# Patient Record
Sex: FEMALE | Race: WHITE | NOT HISPANIC OR LATINO | Employment: FULL TIME | URBAN - METROPOLITAN AREA
[De-identification: names, ages, dates, MRNs, and addresses within clinical notes are randomized per-mention and may not be internally consistent; named-entity substitution may affect disease eponyms.]

---

## 2023-06-29 DIAGNOSIS — F41.1 GENERALIZED ANXIETY DISORDER: ICD-10-CM

## 2023-06-29 DIAGNOSIS — I10 ESSENTIAL (PRIMARY) HYPERTENSION: ICD-10-CM

## 2023-06-29 RX ORDER — LOSARTAN POTASSIUM AND HYDROCHLOROTHIAZIDE 12.5; 1 MG/1; MG/1
TABLET ORAL
Qty: 90 TABLET | Refills: 1 | OUTPATIENT
Start: 2023-06-29

## 2023-06-29 RX ORDER — ESCITALOPRAM OXALATE 10 MG/1
TABLET ORAL
Qty: 90 TABLET | Refills: 1 | OUTPATIENT
Start: 2023-06-29

## 2023-06-29 RX ORDER — METOPROLOL SUCCINATE 50 MG/1
TABLET, EXTENDED RELEASE ORAL
Qty: 90 TABLET | Refills: 1 | OUTPATIENT
Start: 2023-06-29

## 2023-06-29 NOTE — TELEPHONE ENCOUNTER
Patient was lvm on machine to call back and schedule. If patient calls back please schedule   Please advise

## 2023-07-06 DIAGNOSIS — I10 ESSENTIAL HYPERTENSION: ICD-10-CM

## 2023-07-06 DIAGNOSIS — F41.1 GENERALIZED ANXIETY DISORDER: ICD-10-CM

## 2023-07-06 PROBLEM — E87.1 HYPONATREMIA: Status: ACTIVE | Noted: 2023-07-06

## 2023-07-06 PROBLEM — D75.89 MACROCYTOSIS: Status: ACTIVE | Noted: 2023-07-06

## 2023-07-06 RX ORDER — ESCITALOPRAM OXALATE 10 MG/1
10 TABLET ORAL DAILY
COMMUNITY
End: 2023-07-06 | Stop reason: SDUPTHER

## 2023-07-06 RX ORDER — LOSARTAN POTASSIUM AND HYDROCHLOROTHIAZIDE 12.5; 1 MG/1; MG/1
1 TABLET ORAL DAILY
Qty: 30 TABLET | Refills: 1 | Status: SHIPPED | OUTPATIENT
Start: 2023-07-06 | End: 2023-08-10 | Stop reason: SDUPTHER

## 2023-07-06 RX ORDER — ESCITALOPRAM OXALATE 10 MG/1
10 TABLET ORAL DAILY
Qty: 30 TABLET | Refills: 1 | Status: SHIPPED | OUTPATIENT
Start: 2023-07-06 | End: 2023-08-10 | Stop reason: SDUPTHER

## 2023-07-06 RX ORDER — METOPROLOL SUCCINATE 50 MG/1
50 TABLET, EXTENDED RELEASE ORAL DAILY
Qty: 30 TABLET | Refills: 1 | Status: SHIPPED | OUTPATIENT
Start: 2023-07-06 | End: 2023-08-10 | Stop reason: SDUPTHER

## 2023-07-06 RX ORDER — LOSARTAN POTASSIUM AND HYDROCHLOROTHIAZIDE 12.5; 1 MG/1; MG/1
1 TABLET ORAL DAILY
COMMUNITY
End: 2023-07-06 | Stop reason: SDUPTHER

## 2023-07-06 RX ORDER — METOPROLOL SUCCINATE 50 MG/1
50 TABLET, EXTENDED RELEASE ORAL DAILY
COMMUNITY
End: 2023-07-06 | Stop reason: SDUPTHER

## 2023-07-06 NOTE — TELEPHONE ENCOUNTER
Rx Refill Request Telephone Encounter    Name:  Lexii More  :  [id number]  Medication Name:    METOPROLOL 50 MG   LOSARTAN POTASSIUM-hydrochlorothiazide 100-12.5 MG   ESCITALOPRAM 10 MG  ALL MEDICATIONS LAST FILLED ON   Specific Pharmacy location:  [facility]  Date of last appointment:    Date of next appointment:  08/10  Best number to reach patient:  [phone]    PLEASE ADVISE.

## 2023-08-08 PROBLEM — T88.7XXA MEDICATION SIDE EFFECT: Status: ACTIVE | Noted: 2023-08-08

## 2023-08-08 PROBLEM — R05.8 COUGH DUE TO ACE INHIBITOR: Status: ACTIVE | Noted: 2023-08-08

## 2023-08-08 PROBLEM — R03.0 ELEVATED BLOOD PRESSURE READING: Status: ACTIVE | Noted: 2023-08-08

## 2023-08-08 PROBLEM — K52.9 CHRONIC DIARRHEA: Status: ACTIVE | Noted: 2023-08-08

## 2023-08-08 PROBLEM — F41.0 PANIC DISORDER: Status: ACTIVE | Noted: 2023-08-08

## 2023-08-08 PROBLEM — T46.4X5A COUGH DUE TO ACE INHIBITOR: Status: ACTIVE | Noted: 2023-08-08

## 2023-08-10 ENCOUNTER — OFFICE VISIT (OUTPATIENT)
Dept: PRIMARY CARE | Facility: CLINIC | Age: 48
End: 2023-08-10

## 2023-08-10 VITALS
HEART RATE: 89 BPM | HEIGHT: 61 IN | BODY MASS INDEX: 19.07 KG/M2 | DIASTOLIC BLOOD PRESSURE: 88 MMHG | TEMPERATURE: 97.6 F | WEIGHT: 101 LBS | RESPIRATION RATE: 16 BRPM | SYSTOLIC BLOOD PRESSURE: 136 MMHG | OXYGEN SATURATION: 97 %

## 2023-08-10 DIAGNOSIS — I10 ESSENTIAL HYPERTENSION: Primary | ICD-10-CM

## 2023-08-10 DIAGNOSIS — F41.1 GENERALIZED ANXIETY DISORDER: ICD-10-CM

## 2023-08-10 PROCEDURE — 4004F PT TOBACCO SCREEN RCVD TLK: CPT | Performed by: FAMILY MEDICINE

## 2023-08-10 PROCEDURE — 3079F DIAST BP 80-89 MM HG: CPT | Performed by: FAMILY MEDICINE

## 2023-08-10 PROCEDURE — 99212 OFFICE O/P EST SF 10 MIN: CPT | Performed by: FAMILY MEDICINE

## 2023-08-10 PROCEDURE — 3075F SYST BP GE 130 - 139MM HG: CPT | Performed by: FAMILY MEDICINE

## 2023-08-10 RX ORDER — ESCITALOPRAM OXALATE 10 MG/1
10 TABLET ORAL DAILY
Qty: 90 TABLET | Refills: 1 | Status: SHIPPED | OUTPATIENT
Start: 2023-08-10 | End: 2023-12-23

## 2023-08-10 RX ORDER — METOPROLOL SUCCINATE 50 MG/1
50 TABLET, EXTENDED RELEASE ORAL DAILY
Qty: 90 TABLET | Refills: 1 | Status: SHIPPED | OUTPATIENT
Start: 2023-08-10 | End: 2024-03-02

## 2023-08-10 RX ORDER — LOSARTAN POTASSIUM AND HYDROCHLOROTHIAZIDE 12.5; 1 MG/1; MG/1
1 TABLET ORAL DAILY
Qty: 90 TABLET | Refills: 1 | Status: SHIPPED | OUTPATIENT
Start: 2023-08-10 | End: 2024-03-02

## 2023-08-10 ASSESSMENT — ANXIETY QUESTIONNAIRES
3. WORRYING TOO MUCH ABOUT DIFFERENT THINGS: NOT AT ALL
4. TROUBLE RELAXING: NOT AT ALL
6. BECOMING EASILY ANNOYED OR IRRITABLE: NOT AT ALL
5. BEING SO RESTLESS THAT IT IS HARD TO SIT STILL: NOT AT ALL
IF YOU CHECKED OFF ANY PROBLEMS ON THIS QUESTIONNAIRE, HOW DIFFICULT HAVE THESE PROBLEMS MADE IT FOR YOU TO DO YOUR WORK, TAKE CARE OF THINGS AT HOME, OR GET ALONG WITH OTHER PEOPLE: NOT DIFFICULT AT ALL
2. NOT BEING ABLE TO STOP OR CONTROL WORRYING: NOT AT ALL
1. FEELING NERVOUS, ANXIOUS, OR ON EDGE: NOT AT ALL
7. FEELING AFRAID AS IF SOMETHING AWFUL MIGHT HAPPEN: NOT AT ALL
GAD7 TOTAL SCORE: 0

## 2023-08-10 ASSESSMENT — PATIENT HEALTH QUESTIONNAIRE - PHQ9
SUM OF ALL RESPONSES TO PHQ9 QUESTIONS 1 AND 2: 0
2. FEELING DOWN, DEPRESSED OR HOPELESS: NOT AT ALL
1. LITTLE INTEREST OR PLEASURE IN DOING THINGS: NOT AT ALL

## 2023-08-11 ENCOUNTER — TELEPHONE (OUTPATIENT)
Dept: PRIMARY CARE | Facility: CLINIC | Age: 48
End: 2023-08-11

## 2023-09-11 ENCOUNTER — TELEPHONE (OUTPATIENT)
Dept: PRIMARY CARE | Facility: CLINIC | Age: 48
End: 2023-09-11

## 2023-10-11 ENCOUNTER — TELEPHONE (OUTPATIENT)
Dept: PRIMARY CARE | Facility: CLINIC | Age: 48
End: 2023-10-11

## 2023-12-23 DIAGNOSIS — F41.1 GENERALIZED ANXIETY DISORDER: ICD-10-CM

## 2023-12-23 RX ORDER — ESCITALOPRAM OXALATE 10 MG/1
10 TABLET ORAL DAILY
Qty: 90 TABLET | Refills: 0 | Status: SHIPPED | OUTPATIENT
Start: 2023-12-23 | End: 2024-03-15 | Stop reason: SDUPTHER

## 2024-02-13 ENCOUNTER — APPOINTMENT (OUTPATIENT)
Dept: PRIMARY CARE | Facility: CLINIC | Age: 49
End: 2024-02-13

## 2024-03-02 DIAGNOSIS — I10 ESSENTIAL HYPERTENSION: ICD-10-CM

## 2024-03-02 RX ORDER — LOSARTAN POTASSIUM AND HYDROCHLOROTHIAZIDE 12.5; 1 MG/1; MG/1
1 TABLET ORAL DAILY
Qty: 30 TABLET | Refills: 0 | Status: SHIPPED | OUTPATIENT
Start: 2024-03-02 | End: 2024-03-15 | Stop reason: SDUPTHER

## 2024-03-02 RX ORDER — METOPROLOL SUCCINATE 50 MG/1
50 TABLET, EXTENDED RELEASE ORAL DAILY
Qty: 30 TABLET | Refills: 0 | Status: SHIPPED | OUTPATIENT
Start: 2024-03-02 | End: 2024-03-15 | Stop reason: SDUPTHER

## 2024-03-14 LAB
ALANINE AMINOTRANSFERASE (SGPT) (U/L) IN SER/PLAS: 17 U/L
ALKALINE PHOSPHATASE (U/L) IN SER/PLAS: 59 U/L
ASPARTATE AMINOTRANSFERASE (SGOT) (U/L) IN SER/PLAS: 29 U/L
BILIRUBIN, TOTAL  (MG/DL) IN SER/PLAS: 1.2 MG/DL (ref 0–1.2)
CHOLESTEROL (MG/DL) IN SER/PLAS: 188 MG/DL
CREATININE (MG/DL) IN SER/PLAS: 0.62 MG/DL
GLUCOSE: 83 MG/DL
HDL-CHOLESTEROL (MG/DL) IN SER/PLAS: 82 MG/DL
LDL CHOLESTEROL: 92 MG/DL
POTASSIUM (MMOL/L) IN SER/PLAS: 4.3 MMOL/L
SODIUM (MMOL/L) IN SER/PLAS: 125 MMOL/L
TRIGLYCERIDES  (MG/DL) IN SER/PLAS: 75 MG/DL
UREA NITROGEN (MG/DL) IN SER/PLAS: 6 MG/DL

## 2024-03-15 ENCOUNTER — OFFICE VISIT (OUTPATIENT)
Dept: PRIMARY CARE | Facility: CLINIC | Age: 49
End: 2024-03-15

## 2024-03-15 VITALS
DIASTOLIC BLOOD PRESSURE: 84 MMHG | SYSTOLIC BLOOD PRESSURE: 138 MMHG | TEMPERATURE: 97.4 F | HEIGHT: 61 IN | BODY MASS INDEX: 19.52 KG/M2 | HEART RATE: 76 BPM | RESPIRATION RATE: 16 BRPM | WEIGHT: 103.4 LBS | OXYGEN SATURATION: 99 %

## 2024-03-15 DIAGNOSIS — Z00.00 HEALTHCARE MAINTENANCE: ICD-10-CM

## 2024-03-15 DIAGNOSIS — F41.1 GENERALIZED ANXIETY DISORDER: ICD-10-CM

## 2024-03-15 DIAGNOSIS — I10 ESSENTIAL HYPERTENSION: Primary | ICD-10-CM

## 2024-03-15 PROCEDURE — 4004F PT TOBACCO SCREEN RCVD TLK: CPT | Performed by: FAMILY MEDICINE

## 2024-03-15 PROCEDURE — 3079F DIAST BP 80-89 MM HG: CPT | Performed by: FAMILY MEDICINE

## 2024-03-15 PROCEDURE — 99212 OFFICE O/P EST SF 10 MIN: CPT | Performed by: FAMILY MEDICINE

## 2024-03-15 PROCEDURE — 3075F SYST BP GE 130 - 139MM HG: CPT | Performed by: FAMILY MEDICINE

## 2024-03-15 RX ORDER — ESCITALOPRAM OXALATE 10 MG/1
10 TABLET ORAL DAILY
Qty: 90 TABLET | Refills: 0 | Status: SHIPPED | OUTPATIENT
Start: 2024-03-15

## 2024-03-15 RX ORDER — LOSARTAN POTASSIUM AND HYDROCHLOROTHIAZIDE 12.5; 1 MG/1; MG/1
1 TABLET ORAL DAILY
Qty: 90 TABLET | Refills: 1 | Status: SHIPPED | OUTPATIENT
Start: 2024-03-15

## 2024-03-15 RX ORDER — METOPROLOL SUCCINATE 50 MG/1
50 TABLET, EXTENDED RELEASE ORAL DAILY
Qty: 90 TABLET | Refills: 1 | Status: SHIPPED | OUTPATIENT
Start: 2024-03-15

## 2024-03-15 ASSESSMENT — ENCOUNTER SYMPTOMS
DEPRESSED MOOD: 0
NERVOUS/ANXIOUS: 1
IRRITABILITY: 1
DECREASED CONCENTRATION: 0
SHORTNESS OF BREATH: 0

## 2024-03-15 ASSESSMENT — ANXIETY QUESTIONNAIRES
1. FEELING NERVOUS, ANXIOUS, OR ON EDGE: NOT AT ALL
7. FEELING AFRAID AS IF SOMETHING AWFUL MIGHT HAPPEN: NOT AT ALL
2. NOT BEING ABLE TO STOP OR CONTROL WORRYING: NOT AT ALL
4. TROUBLE RELAXING: NOT AT ALL
IF YOU CHECKED OFF ANY PROBLEMS ON THIS QUESTIONNAIRE, HOW DIFFICULT HAVE THESE PROBLEMS MADE IT FOR YOU TO DO YOUR WORK, TAKE CARE OF THINGS AT HOME, OR GET ALONG WITH OTHER PEOPLE: NOT DIFFICULT AT ALL
3. WORRYING TOO MUCH ABOUT DIFFERENT THINGS: NOT AT ALL
5. BEING SO RESTLESS THAT IT IS HARD TO SIT STILL: NOT AT ALL
GAD7 TOTAL SCORE: 0
6. BECOMING EASILY ANNOYED OR IRRITABLE: NOT AT ALL

## 2024-03-15 NOTE — PROGRESS NOTES
"Subjective   Patient ID: Lexii More is a 48 y.o. female who presents for Follow-up (Hypertension, anxiety) and Annual Exam.    Patient is here for follow-up on hypertension. She is exercising and is adherent to a low-salt diet. Patient denies chest pain, dyspnea, exertional chest pressure/discomfort, near-syncope, orthopnea, palpitations, paroxysmal nocturnal dyspnea, and syncope. Taking his medication regularly with no side effects.    Anxiety  Presents for follow-up visit. Symptoms include irritability and nervous/anxious behavior. Patient reports no chest pain, decreased concentration, depressed mood, dizziness, excessive worry, palpitations, shortness of breath or suicidal ideas. Symptoms occur occasionally. The severity of symptoms is mild. The patient sleeps 8 hours per night. The quality of sleep is good. Nighttime awakenings: occasional.     Compliance with medications is %.      Review of Systems   Constitutional:  Positive for irritability. Negative for chills and fever.   HENT:  Negative for congestion, ear pain, nosebleeds, rhinorrhea and sore throat.    Respiratory:  Negative for cough, shortness of breath and wheezing.    Cardiovascular:  Negative for chest pain, palpitations and leg swelling.   Gastrointestinal:  Negative for abdominal pain, constipation, diarrhea and vomiting.   Genitourinary:  Negative for dysuria, frequency and hematuria.   Neurological:  Negative for dizziness, tremors, numbness and headaches.   Psychiatric/Behavioral:  Negative for decreased concentration and suicidal ideas. The patient is nervous/anxious.      Objective   /84 (BP Location: Left arm, Patient Position: Sitting)   Pulse 76   Temp 36.3 °C (97.4 °F)   Resp 16   Ht 1.549 m (5' 1\")   Wt 46.9 kg (103 lb 6.4 oz)   SpO2 99%   BMI 19.54 kg/m²     Physical Exam  Constitutional:       General: She is not in acute distress.     Appearance: Normal appearance.   HENT:      Head: Normocephalic and " atraumatic.      Mouth/Throat:      Mouth: Mucous membranes are moist.      Pharynx: Oropharynx is clear. No oropharyngeal exudate or posterior oropharyngeal erythema.   Eyes:      General: No scleral icterus.     Extraocular Movements: Extraocular movements intact.      Pupils: Pupils are equal, round, and reactive to light.   Cardiovascular:      Rate and Rhythm: Normal rate and regular rhythm.      Pulses: Normal pulses.      Heart sounds: No murmur heard.     No friction rub. No gallop.   Pulmonary:      Effort: Pulmonary effort is normal.      Breath sounds: No wheezing, rhonchi or rales.   Skin:     General: Skin is warm.      Coloration: Skin is not jaundiced or pale.      Findings: No erythema or rash.   Neurological:      General: No focal deficit present.      Mental Status: She is alert and oriented to person, place, and time.      Cranial Nerves: No cranial nerve deficit.      Sensory: No sensory deficit.      Coordination: Coordination normal.      Gait: Gait normal.         Assessment/Plan   Problem List Items Addressed This Visit       Generalized anxiety disorder     The risks and benefits of my recommendations, as well as other treatment options were discussed with the patient today.    The side effects of the medications were discussed.  Advised not to take the medication with alcohol .  Exercise regularly and this can give you a sense of well being and help decrease feelings of anxiety.;  Get plenty of sleep. Sleep rests your brain as well as your body, and can improve your general sense of wellbeing as well as your mood.;  Avoid alcohol and drug abuse. It may seem that alcohol or drugs relax you. But in the long run they make anxiety worse and cause more problems.;  Avoid caffeine. Caffeine is found in coffee, tea, soft drinks and chocolate. Caffeine may increase your sense of anxiety because it stimulates your nervous system. Also avoid over-the-counter diet pills, and cough and cold medicines  "that contain a decongestant.;  Confront the things that have made you anxious in the past. Begin by just picturing yourself confronting these things. By doing this, you can get used to the idea of confronting the things that make you anxious before you actually do it. After you feel more comfortable picturing yourself confronting these things, you can begin to actually face them.;  If you feel yourself getting anxious, practice a relaxation technique or focus on a simple task, such as counting backward from 100 to 0.;  Although feelings of anxiety are scary, they won't hurt you. Label the level of your fear from 0 to 10 and keep track as it goes up and down. Notice that it doesn't stay at a very high level for more than a few seconds. When the fear comes, accept it. Wait and give it time to pass without running away from it.;  Take medications as prescribed.           Relevant Medications    escitalopram (Lexapro) 10 mg tablet    Other Relevant Orders    Follow Up In [facility] - PCP - Established    Essential hypertension - Primary     Dietary sodium restriction.  Regular aerobic exercise program is recommended to help achieve and maintain normal body weight, fitness and improve lipid balance. .  Dietary changes: Increase soluble fiber  Plant sterols 2grams per day (e.g. Benecol)  Reduce saturated fat, \"trans\" monounsaturated fatty acids, and cholesterol           Relevant Medications    losartan-hydrochlorothiazide (Hyzaar) 100-12.5 mg tablet    metoprolol succinate XL (Toprol-XL) 50 mg 24 hr tablet    Other Relevant Orders    Follow Up In [facility] - PCP - Established    Healthcare maintenance     Discussed the need for mammogram and colonoscopy.  Patient declined.  The importance of healthcare maintenance and prevention of treatment was emphasized.  She understands that declined.    Recommend low-cholesterol diet, low-fat diet and low-salt diet.  The need for lifelong dietary compliance in " order to reduce cardiac risk is recommended.  We will also recommend regular exercise program to improve lipid balance and overall health.  Recommend decreasing fat and cholesterol in diet, increasing aerobic exercise with a goal of 4 or more days per week            Scribe Attestation  By signing my name below, Elise SLAUGHTER Scribe   attest that this documentation has been prepared under the direction and in the presence of Good Franco MD.

## 2024-03-15 NOTE — ASSESSMENT & PLAN NOTE
Discussed the need for mammogram and colonoscopy.  Patient declined.  The importance of healthcare maintenance and prevention of treatment was emphasized.  She understands that declined.    Recommend low-cholesterol diet, low-fat diet and low-salt diet.  The need for lifelong dietary compliance in order to reduce cardiac risk is recommended.  We will also recommend regular exercise program to improve lipid balance and overall health.  Recommend decreasing fat and cholesterol in diet, increasing aerobic exercise with a goal of 4 or more days per week

## 2024-03-16 ASSESSMENT — ENCOUNTER SYMPTOMS
TREMORS: 0
DIARRHEA: 0
PALPITATIONS: 0
NUMBNESS: 0
DYSURIA: 0
HEMATURIA: 0
CONSTIPATION: 0
DIZZINESS: 0
ABDOMINAL PAIN: 0
HEADACHES: 0
SORE THROAT: 0
CHILLS: 0
COUGH: 0
VOMITING: 0
WHEEZING: 0
RHINORRHEA: 0
FREQUENCY: 0
FEVER: 0

## 2024-03-18 ENCOUNTER — TELEPHONE (OUTPATIENT)
Dept: PRIMARY CARE | Facility: CLINIC | Age: 49
End: 2024-03-18

## 2024-03-18 DIAGNOSIS — D75.89 MACROCYTOSIS: Primary | ICD-10-CM

## 2024-03-18 DIAGNOSIS — E87.1 HYPONATREMIA: ICD-10-CM

## 2024-03-18 NOTE — TELEPHONE ENCOUNTER
----- Message from Good Franco MD sent at 3/16/2024  4:30 PM EDT -----  Please call Lexii More that her Lab results revealed: Sodium level, normal kidney and liver functions, normal blood glucose.  Normal blood count but significantly decreased from previous result and increased size of red blood cells.  Normal cholesterol including good cholesterol/HDL and bad cholesterol/LDL.  Schedule BMP for hyponatremia, CBC with differentials and vitamin B12 and folate for macrocytosis STAT.

## 2024-06-28 DIAGNOSIS — F41.1 GENERALIZED ANXIETY DISORDER: ICD-10-CM

## 2024-06-28 RX ORDER — ESCITALOPRAM OXALATE 10 MG/1
10 TABLET ORAL DAILY
Qty: 90 TABLET | Refills: 0 | Status: SHIPPED | OUTPATIENT
Start: 2024-06-28

## 2024-06-28 NOTE — TELEPHONE ENCOUNTER
Rx Refill Request Telephone Encounter    Name:  Lexii More  :  [id number]  Medication Name:  lexapro 10mg   Specific Pharmacy location:  [facility]   Date of last appointment:  3/15/24  Date of next appointment:  N/A

## 2024-09-29 ENCOUNTER — TELEPHONE (OUTPATIENT)
Dept: PRIMARY CARE | Facility: CLINIC | Age: 49
End: 2024-09-29

## 2024-09-29 DIAGNOSIS — I10 ESSENTIAL HYPERTENSION: ICD-10-CM

## 2024-09-29 RX ORDER — METOPROLOL SUCCINATE 50 MG/1
50 TABLET, EXTENDED RELEASE ORAL DAILY
Qty: 90 TABLET | Refills: 1 | OUTPATIENT
Start: 2024-09-29

## 2024-09-29 RX ORDER — LOSARTAN POTASSIUM AND HYDROCHLOROTHIAZIDE 12.5; 1 MG/1; MG/1
1 TABLET ORAL DAILY
Qty: 90 TABLET | Refills: 1 | OUTPATIENT
Start: 2024-09-29

## 2024-09-30 ENCOUNTER — TELEPHONE (OUTPATIENT)
Dept: PRIMARY CARE | Facility: CLINIC | Age: 49
End: 2024-09-30

## 2024-09-30 DIAGNOSIS — F41.1 GENERALIZED ANXIETY DISORDER: Primary | ICD-10-CM

## 2024-09-30 RX ORDER — ESCITALOPRAM OXALATE 10 MG/1
10 TABLET ORAL DAILY
Qty: 90 TABLET | Refills: 0 | OUTPATIENT
Start: 2024-09-30

## 2024-10-17 LAB
CREATININE (MG/DL) IN SER/PLAS: 0.58 MG/DL
GLUCOSE: 93 MG/DL
POTASSIUM (MMOL/L) IN SER/PLAS: 4 MMOL/L
SODIUM (MMOL/L) IN SER/PLAS: 127 MMOL/L
UREA NITROGEN (MG/DL) IN SER/PLAS: 5 MG/DL

## 2024-10-21 ENCOUNTER — APPOINTMENT (OUTPATIENT)
Dept: PRIMARY CARE | Facility: CLINIC | Age: 49
End: 2024-10-21

## 2024-10-21 VITALS
TEMPERATURE: 97.9 F | WEIGHT: 101 LBS | RESPIRATION RATE: 14 BRPM | HEART RATE: 110 BPM | BODY MASS INDEX: 19.07 KG/M2 | SYSTOLIC BLOOD PRESSURE: 138 MMHG | HEIGHT: 61 IN | OXYGEN SATURATION: 99 % | DIASTOLIC BLOOD PRESSURE: 84 MMHG

## 2024-10-21 DIAGNOSIS — D75.89 MACROCYTOSIS: ICD-10-CM

## 2024-10-21 DIAGNOSIS — F41.1 GENERALIZED ANXIETY DISORDER: ICD-10-CM

## 2024-10-21 DIAGNOSIS — E87.1 HYPONATREMIA: ICD-10-CM

## 2024-10-21 DIAGNOSIS — I10 ESSENTIAL HYPERTENSION: Primary | ICD-10-CM

## 2024-10-21 PROCEDURE — 4004F PT TOBACCO SCREEN RCVD TLK: CPT | Performed by: FAMILY MEDICINE

## 2024-10-21 PROCEDURE — 3075F SYST BP GE 130 - 139MM HG: CPT | Performed by: FAMILY MEDICINE

## 2024-10-21 PROCEDURE — 3008F BODY MASS INDEX DOCD: CPT | Performed by: FAMILY MEDICINE

## 2024-10-21 PROCEDURE — 99212 OFFICE O/P EST SF 10 MIN: CPT | Performed by: FAMILY MEDICINE

## 2024-10-21 PROCEDURE — 3079F DIAST BP 80-89 MM HG: CPT | Performed by: FAMILY MEDICINE

## 2024-10-21 RX ORDER — LOSARTAN POTASSIUM 100 MG/1
100 TABLET ORAL DAILY
Qty: 90 TABLET | Refills: 0 | Status: SHIPPED | OUTPATIENT
Start: 2024-10-21

## 2024-10-21 RX ORDER — ESCITALOPRAM OXALATE 10 MG/1
10 TABLET ORAL DAILY
Qty: 90 TABLET | Refills: 0 | Status: SHIPPED | OUTPATIENT
Start: 2024-10-21

## 2024-10-21 RX ORDER — METOPROLOL SUCCINATE 50 MG/1
50 TABLET, EXTENDED RELEASE ORAL DAILY
Qty: 90 TABLET | Refills: 0 | Status: SHIPPED | OUTPATIENT
Start: 2024-10-21

## 2024-10-21 ASSESSMENT — ENCOUNTER SYMPTOMS
TREMORS: 0
NUMBNESS: 0
HEMATURIA: 0
COUGH: 0
VOMITING: 0
DEPRESSED MOOD: 0
HEADACHES: 0
PANIC: 0
DIARRHEA: 0
DYSURIA: 0
MUSCLE TENSION: 0
INSOMNIA: 0
FEVER: 0
SHORTNESS OF BREATH: 0
RESTLESSNESS: 0
WHEEZING: 0
DECREASED CONCENTRATION: 0
PALPITATIONS: 0
NAUSEA: 0
IRRITABILITY: 0
FREQUENCY: 0
FEELING OF CHOKING: 0
CONFUSION: 0
COMPULSIONS: 0
HYPERVENTILATION: 0
NERVOUS/ANXIOUS: 0
CHILLS: 0
DIZZINESS: 0
RHINORRHEA: 0
MALAISE: 0
CONSTIPATION: 0
ABDOMINAL PAIN: 0
THOUGHT CONTENT - OBSESSIONS: 0
SORE THROAT: 0

## 2024-10-21 ASSESSMENT — ANXIETY QUESTIONNAIRES
GAD7 TOTAL SCORE: 0
1. FEELING NERVOUS, ANXIOUS, OR ON EDGE: NOT AT ALL
7. FEELING AFRAID AS IF SOMETHING AWFUL MIGHT HAPPEN: NOT AT ALL
6. BECOMING EASILY ANNOYED OR IRRITABLE: NOT AT ALL
4. TROUBLE RELAXING: NOT AT ALL
IF YOU CHECKED OFF ANY PROBLEMS ON THIS QUESTIONNAIRE, HOW DIFFICULT HAVE THESE PROBLEMS MADE IT FOR YOU TO DO YOUR WORK, TAKE CARE OF THINGS AT HOME, OR GET ALONG WITH OTHER PEOPLE: NOT DIFFICULT AT ALL
2. NOT BEING ABLE TO STOP OR CONTROL WORRYING: NOT AT ALL
3. WORRYING TOO MUCH ABOUT DIFFERENT THINGS: NOT AT ALL
5. BEING SO RESTLESS THAT IT IS HARD TO SIT STILL: NOT AT ALL

## 2024-10-21 NOTE — ASSESSMENT & PLAN NOTE
She admits to excessive alcohol consumption which may be because of her macrocytosis and hyponatremia.  She was advised of the importance of reducing alcohol consumption.  We will change her Hyzaar to Cozaar 100 mg daily.  The SSRI may also contribute to the hyponatremia.  We will reevaluate at the next appointment.  Consider further evaluation if residual is still low at that time.  Repeat BMP prior to appointment in 2 months.

## 2024-10-21 NOTE — ASSESSMENT & PLAN NOTE
"We will discontinue the Losartan hydrochlorothiazide and start her on Losartan 100 mg daily. Dietary sodium restriction.  Regular aerobic exercise program is recommended to help achieve and maintain normal body weight, fitness and improve lipid balance. .  Dietary changes: Increase soluble fiber  Plant sterols 2grams per day (e.g. Benecol)  Reduce saturated fat, \"trans\" monounsaturated fatty acids, and cholesterol.   "

## 2024-10-21 NOTE — PROGRESS NOTES
"Subjective   Patient ID: Lexii More is a 49 y.o. female who presents for Follow-up (Hypertension, Anxiety and labs).    Patient is here for follow-up on hypertension. Cardiac symptoms: none. She has no chest pain, dyspnea, exertional chest pressure/discomfort, near-syncope, orthopnea, palpitations, paroxysmal nocturnal dyspnea, and syncope. Use of agents associated with hypertension: none. History of target organ damage: none.    Anxiety  Presents for follow-up visit. Patient reports no chest pain, compulsions, confusion, decreased concentration, depressed mood, dizziness, dry mouth, excessive worry, feeling of choking, hyperventilation, impotence, insomnia, irritability, malaise, muscle tension, nausea, nervous/anxious behavior, obsessions, palpitations, panic, restlessness, shortness of breath or suicidal ideas. The patient sleeps 7 hours per night. The quality of sleep is good. Nighttime awakenings: occasional.     Compliance with medications is %.        Review of Systems   Constitutional:  Negative for chills, fever and irritability.   HENT:  Negative for congestion, ear pain, nosebleeds, rhinorrhea and sore throat.    Respiratory:  Negative for cough, shortness of breath and wheezing.    Cardiovascular:  Negative for chest pain, palpitations and leg swelling.   Gastrointestinal:  Negative for abdominal pain, constipation, diarrhea, nausea and vomiting.   Genitourinary:  Negative for dysuria, frequency, hematuria and impotence.   Neurological:  Negative for dizziness, tremors, numbness and headaches.   Psychiatric/Behavioral:  Negative for confusion, decreased concentration and suicidal ideas. The patient is not nervous/anxious and does not have insomnia.        Objective   /84   Pulse 110   Temp 36.6 °C (97.9 °F)   Resp 14   Ht 1.549 m (5' 1\")   Wt 45.8 kg (101 lb)   SpO2 99%   BMI 19.08 kg/m²     Physical Exam  Constitutional:       General: She is not in acute distress.     " Appearance: Normal appearance.   HENT:      Head: Normocephalic and atraumatic.      Mouth/Throat:      Mouth: Mucous membranes are moist.      Pharynx: Oropharynx is clear. No oropharyngeal exudate or posterior oropharyngeal erythema.   Eyes:      General: No scleral icterus.     Extraocular Movements: Extraocular movements intact.      Pupils: Pupils are equal, round, and reactive to light.   Cardiovascular:      Rate and Rhythm: Normal rate and regular rhythm.      Pulses: Normal pulses.      Heart sounds: No murmur heard.     No friction rub. No gallop.   Pulmonary:      Effort: Pulmonary effort is normal.      Breath sounds: No wheezing, rhonchi or rales.   Skin:     General: Skin is warm.      Coloration: Skin is not jaundiced or pale.      Findings: No erythema or rash.   Neurological:      General: No focal deficit present.      Mental Status: She is alert and oriented to person, place, and time.      Cranial Nerves: No cranial nerve deficit.      Sensory: No sensory deficit.      Coordination: Coordination normal.      Gait: Gait normal.         Abstract on 10/18/2024   Component Date Value Ref Range Status    Glucose 10/17/2024 93  mg/dL Final    Urea Nitrogen 10/17/2024 5  mg/dL Final    Creatinine 10/17/2024 0.58  mg/dL Final    Potassium 10/17/2024 4.0  mmol/L Final    Sodium 10/17/2024 127  mmol/L Final     Assessment/Plan   Problem List Items Addressed This Visit       Generalized anxiety disorder     Well-controlled, continue current medications and management.  The risks and benefits of my recommendations, as well as other treatment options were discussed with the patient today.    The side effects of the medications were discussed.  Advised not to take the medication with alcohol .  Exercise regularly and this can give you a sense of well being and help decrease feelings of anxiety.;  Get plenty of sleep. Sleep rests your brain as well as your body, and can improve your general sense of wellbeing  as well as your mood.;  Avoid alcohol and drug abuse. It may seem that alcohol or drugs relax you. But in the long run they make anxiety worse and cause more problems.;  Avoid caffeine. Caffeine is found in coffee, tea, soft drinks and chocolate. Caffeine may increase your sense of anxiety because it stimulates your nervous system. Also avoid over-the-counter diet pills, and cough and cold medicines that contain a decongestant.;  Confront the things that have made you anxious in the past. Begin by just picturing yourself confronting these things. By doing this, you can get used to the idea of confronting the things that make you anxious before you actually do it. After you feel more comfortable picturing yourself confronting these things, you can begin to actually face them.;  If you feel yourself getting anxious, practice a relaxation technique or focus on a simple task, such as counting backward from 100 to 0.;  Although feelings of anxiety are scary, they won't hurt you. Label the level of your fear from 0 to 10 and keep track as it goes up and down. Notice that it doesn't stay at a very high level for more than a few seconds. When the fear comes, accept it. Wait and give it time to pass without running away from it.;  Take medications as prescribed.         Relevant Medications    escitalopram (Lexapro) 10 mg tablet    Macrocytosis     And decreasing alcohol consumption.  She has normal folate and vitamin B12 levels.         Hyponatremia     She admits to excessive alcohol consumption which may be because of her macrocytosis and hyponatremia.  She was advised of the importance of reducing alcohol consumption.  We will change her Hyzaar to Cozaar 100 mg daily.  The SSRI may also contribute to the hyponatremia.  We will reevaluate at the next appointment.  Consider further evaluation if residual is still low at that time.  Repeat BMP prior to appointment in 2 months.         Relevant Orders    Basic metabolic  "panel    Essential hypertension - Primary     We will discontinue the Losartan hydrochlorothiazide and start her on Losartan 100 mg daily. Dietary sodium restriction.  Regular aerobic exercise program is recommended to help achieve and maintain normal body weight, fitness and improve lipid balance. .  Dietary changes: Increase soluble fiber  Plant sterols 2grams per day (e.g. Benecol)  Reduce saturated fat, \"trans\" monounsaturated fatty acids, and cholesterol.          Relevant Medications    metoprolol succinate XL (Toprol-XL) 50 mg 24 hr tablet    losartan (Cozaar) 100 mg tablet    Other Relevant Orders    Follow Up In [facility] - PCP - Established     Scribe Attestation  By signing my name below, I, Dior Trujillo   attest that this documentation has been prepared under the direction and in the presence of Good Franco MD.  "

## 2024-12-19 ENCOUNTER — APPOINTMENT (OUTPATIENT)
Dept: PRIMARY CARE | Facility: CLINIC | Age: 49
End: 2024-12-19

## 2025-01-18 DIAGNOSIS — I10 ESSENTIAL HYPERTENSION: ICD-10-CM

## 2025-01-18 RX ORDER — METOPROLOL SUCCINATE 50 MG/1
50 TABLET, EXTENDED RELEASE ORAL DAILY
Qty: 30 TABLET | Refills: 0 | Status: SHIPPED | OUTPATIENT
Start: 2025-01-18

## 2025-01-19 DIAGNOSIS — I10 ESSENTIAL HYPERTENSION: ICD-10-CM

## 2025-01-19 DIAGNOSIS — F41.1 GENERALIZED ANXIETY DISORDER: ICD-10-CM

## 2025-01-19 RX ORDER — ESCITALOPRAM OXALATE 10 MG/1
10 TABLET ORAL DAILY
Qty: 30 TABLET | Refills: 0 | Status: SHIPPED | OUTPATIENT
Start: 2025-01-19

## 2025-01-19 RX ORDER — LOSARTAN POTASSIUM 100 MG/1
100 TABLET ORAL DAILY
Qty: 30 TABLET | Refills: 0 | Status: SHIPPED | OUTPATIENT
Start: 2025-01-19

## 2025-01-21 ENCOUNTER — APPOINTMENT (OUTPATIENT)
Dept: PRIMARY CARE | Facility: CLINIC | Age: 50
End: 2025-01-21

## 2025-01-24 LAB
CREATININE (MG/DL) IN SER/PLAS: 0.65 MG/DL
GLUCOSE: 77 MG/DL
POTASSIUM (MMOL/L) IN SER/PLAS: 4 MMOL/L
SODIUM (MMOL/L) IN SER/PLAS: 132 MMOL/L
UREA NITROGEN (MG/DL) IN SER/PLAS: 14 MG/DL

## 2025-01-27 ENCOUNTER — APPOINTMENT (OUTPATIENT)
Dept: PRIMARY CARE | Facility: CLINIC | Age: 50
End: 2025-01-27

## 2025-01-27 VITALS
HEIGHT: 61 IN | DIASTOLIC BLOOD PRESSURE: 84 MMHG | SYSTOLIC BLOOD PRESSURE: 136 MMHG | OXYGEN SATURATION: 99 % | TEMPERATURE: 97.7 F | WEIGHT: 102 LBS | HEART RATE: 88 BPM | RESPIRATION RATE: 14 BRPM | BODY MASS INDEX: 19.26 KG/M2

## 2025-01-27 DIAGNOSIS — F41.1 GENERALIZED ANXIETY DISORDER: ICD-10-CM

## 2025-01-27 DIAGNOSIS — E87.1 HYPONATREMIA: ICD-10-CM

## 2025-01-27 DIAGNOSIS — I10 ESSENTIAL HYPERTENSION: Primary | ICD-10-CM

## 2025-01-27 PROCEDURE — 99212 OFFICE O/P EST SF 10 MIN: CPT | Performed by: FAMILY MEDICINE

## 2025-01-27 PROCEDURE — 3075F SYST BP GE 130 - 139MM HG: CPT | Performed by: FAMILY MEDICINE

## 2025-01-27 PROCEDURE — 3008F BODY MASS INDEX DOCD: CPT | Performed by: FAMILY MEDICINE

## 2025-01-27 PROCEDURE — 3079F DIAST BP 80-89 MM HG: CPT | Performed by: FAMILY MEDICINE

## 2025-01-27 PROCEDURE — 4004F PT TOBACCO SCREEN RCVD TLK: CPT | Performed by: FAMILY MEDICINE

## 2025-01-27 RX ORDER — METOPROLOL SUCCINATE 50 MG/1
50 TABLET, EXTENDED RELEASE ORAL DAILY
Qty: 90 TABLET | Refills: 0 | Status: CANCELLED | OUTPATIENT
Start: 2025-01-27

## 2025-01-27 RX ORDER — ESCITALOPRAM OXALATE 10 MG/1
10 TABLET ORAL DAILY
Qty: 90 TABLET | Refills: 0 | Status: SHIPPED | OUTPATIENT
Start: 2025-01-27

## 2025-01-27 RX ORDER — LOSARTAN POTASSIUM 100 MG/1
100 TABLET ORAL DAILY
Qty: 90 TABLET | Refills: 0 | Status: SHIPPED | OUTPATIENT
Start: 2025-01-27

## 2025-01-27 RX ORDER — METOPROLOL TARTRATE 50 MG/1
50 TABLET ORAL 2 TIMES DAILY
Qty: 180 TABLET | Refills: 0 | Status: SHIPPED | OUTPATIENT
Start: 2025-01-27

## 2025-01-27 ASSESSMENT — ENCOUNTER SYMPTOMS
PALPITATIONS: 0
SORE THROAT: 0
TREMORS: 0
HEADACHES: 0
COUGH: 0
SHORTNESS OF BREATH: 0
WHEEZING: 0
HEMATURIA: 0
FREQUENCY: 0
ABDOMINAL PAIN: 0
DIARRHEA: 0
CONSTIPATION: 0
VOMITING: 0
DYSURIA: 0
RHINORRHEA: 0
CHILLS: 0
NUMBNESS: 0
DIZZINESS: 0
FEVER: 0

## 2025-01-27 NOTE — ASSESSMENT & PLAN NOTE
"We will change the Metoprolol XL to Metoprolol Tartrate 50 mg twice a day and have her continue the Losartan. Dietary sodium restriction.  Regular aerobic exercise program is recommended to help achieve and maintain normal body weight, fitness and improve lipid balance. .  Dietary changes: Increase soluble fiber  Plant sterols 2grams per day (e.g. Benecol)  Reduce saturated fat, \"trans\" monounsaturated fatty acids, and cholesterol  "

## 2025-01-27 NOTE — PROGRESS NOTES
"Subjective   Patient ID: Lexii More is a 49 y.o. female who presents for Follow-up (Hypertension, Hyponatremia and labs).    Patient is here for follow-up on hypertension. Cardiac symptoms: none. She has no chest pain, dyspnea, exertional chest pressure/discomfort, near-syncope, orthopnea, palpitations, paroxysmal nocturnal dyspnea, and syncope. Use of agents associated with hypertension: none. History of target organ damage: none.       Review of Systems   Constitutional:  Negative for chills and fever.   HENT:  Negative for congestion, ear pain, nosebleeds, rhinorrhea and sore throat.    Respiratory:  Negative for cough, shortness of breath and wheezing.    Cardiovascular:  Negative for chest pain, palpitations and leg swelling.   Gastrointestinal:  Negative for abdominal pain, constipation, diarrhea and vomiting.   Genitourinary:  Negative for dysuria, frequency and hematuria.   Neurological:  Negative for dizziness, tremors, numbness and headaches.       Objective   /84   Pulse 88   Temp 36.5 °C (97.7 °F)   Resp 14   Ht 1.549 m (5' 1\")   Wt 46.3 kg (102 lb)   SpO2 99%   BMI 19.27 kg/m²     Physical Exam  Constitutional:       General: She is not in acute distress.     Appearance: Normal appearance.   HENT:      Head: Normocephalic and atraumatic.      Mouth/Throat:      Mouth: Mucous membranes are moist.      Pharynx: Oropharynx is clear. No oropharyngeal exudate or posterior oropharyngeal erythema.   Eyes:      General: No scleral icterus.     Extraocular Movements: Extraocular movements intact.      Pupils: Pupils are equal, round, and reactive to light.   Cardiovascular:      Rate and Rhythm: Normal rate and regular rhythm.      Pulses: Normal pulses.      Heart sounds: No murmur heard.     No friction rub. No gallop.   Pulmonary:      Effort: Pulmonary effort is normal.      Breath sounds: No wheezing, rhonchi or rales.   Skin:     General: Skin is warm.      Coloration: Skin is not " "jaundiced or pale.      Findings: No erythema or rash.   Neurological:      General: No focal deficit present.      Mental Status: She is alert and oriented to person, place, and time.      Cranial Nerves: No cranial nerve deficit.      Sensory: No sensory deficit.      Coordination: Coordination normal.      Gait: Gait normal.         Abstract on 10/18/2024   Component Date Value Ref Range Status    Glucose 10/17/2024 93  mg/dL Final    Urea Nitrogen 10/17/2024 5  mg/dL Final    Creatinine 10/17/2024 0.58  mg/dL Final    Potassium 10/17/2024 4.0  mmol/L Final    Sodium 10/17/2024 127  mmol/L Final     Assessment/Plan   Problem List Items Addressed This Visit       Essential hypertension - Primary     We will change the Metoprolol XL to Metoprolol Tartrate 50 mg twice a day and have her continue the Losartan. Dietary sodium restriction.  Regular aerobic exercise program is recommended to help achieve and maintain normal body weight, fitness and improve lipid balance. .  Dietary changes: Increase soluble fiber  Plant sterols 2grams per day (e.g. Benecol)  Reduce saturated fat, \"trans\" monounsaturated fatty acids, and cholesterol         Relevant Medications    losartan (Cozaar) 100 mg tablet    metoprolol tartrate (Lopressor) 50 mg tablet    Other Relevant Orders    Follow Up In [facility] - PCP - Established    Generalized anxiety disorder     Stable, continue current medications and management.         Relevant Medications    escitalopram (Lexapro) 10 mg tablet    Other Relevant Orders    Follow Up In [facility] - PCP - Established    Hyponatremia     Improving. Continue current medications and we will recheck labs in 3 months.         Relevant Orders    Follow Up In [facility] - PCP - Established    Basic metabolic panel     Scribe Attestation  By signing my name below, IJhoan Scribe   attest that this documentation has been prepared under the direction and in the " presence of Good Franco MD.

## 2025-01-28 ENCOUNTER — TELEPHONE (OUTPATIENT)
Dept: PRIMARY CARE | Facility: CLINIC | Age: 50
End: 2025-01-28

## 2025-04-30 DIAGNOSIS — I10 ESSENTIAL HYPERTENSION: ICD-10-CM

## 2025-04-30 NOTE — TELEPHONE ENCOUNTER
Last disp. 25-90 days     Name: Lexii More  :  1975     Date of last appointment:  2025   Date of next appointment:  Visit date not found   Best number to reach patient:  [phone]

## 2025-05-01 RX ORDER — METOPROLOL TARTRATE 50 MG/1
50 TABLET ORAL 2 TIMES DAILY
Qty: 60 TABLET | Refills: 0 | Status: SHIPPED | OUTPATIENT
Start: 2025-05-01

## 2025-05-05 ENCOUNTER — TELEPHONE (OUTPATIENT)
Dept: PRIMARY CARE | Facility: CLINIC | Age: 50
End: 2025-05-05

## 2025-05-05 DIAGNOSIS — I10 ESSENTIAL HYPERTENSION: ICD-10-CM

## 2025-05-05 DIAGNOSIS — F41.1 GENERALIZED ANXIETY DISORDER: ICD-10-CM

## 2025-05-05 RX ORDER — LOSARTAN POTASSIUM 100 MG/1
100 TABLET ORAL DAILY
Qty: 90 TABLET | Refills: 0 | OUTPATIENT
Start: 2025-05-05

## 2025-05-05 RX ORDER — ESCITALOPRAM OXALATE 10 MG/1
10 TABLET ORAL DAILY
Qty: 90 TABLET | Refills: 0 | OUTPATIENT
Start: 2025-05-05

## 2025-06-25 DIAGNOSIS — F41.1 GENERALIZED ANXIETY DISORDER: ICD-10-CM

## 2025-06-25 DIAGNOSIS — I10 ESSENTIAL HYPERTENSION: ICD-10-CM

## 2025-06-25 RX ORDER — METOPROLOL TARTRATE 50 MG/1
50 TABLET ORAL 2 TIMES DAILY
Qty: 60 TABLET | Refills: 0 | Status: SHIPPED | OUTPATIENT
Start: 2025-06-25

## 2025-06-25 RX ORDER — ESCITALOPRAM OXALATE 10 MG/1
10 TABLET ORAL DAILY
Qty: 30 TABLET | Refills: 0 | Status: SHIPPED | OUTPATIENT
Start: 2025-06-25

## 2025-06-25 RX ORDER — LOSARTAN POTASSIUM 100 MG/1
100 TABLET ORAL DAILY
Qty: 30 TABLET | Refills: 0 | Status: SHIPPED | OUTPATIENT
Start: 2025-06-25

## 2025-06-25 NOTE — TELEPHONE ENCOUNTER
Rx Refill Request Telephone Encounter    Name:  Lexii More  :  [id number]  Medication Name:    metoprolol tartrate (Lopressor) 50 mg tablet   escitalopram (Lexapro) 10 mg tablet   losartan (Cozaar) 100 mg tablet     Specific Pharmacy location:  [facility]  Date of last appointment:  25  Date of next appointment:  25  Best number to reach patient:  [phone]

## 2025-07-22 ENCOUNTER — APPOINTMENT (OUTPATIENT)
Dept: PRIMARY CARE | Facility: CLINIC | Age: 50
End: 2025-07-22

## 2025-07-22 VITALS
HEIGHT: 61 IN | DIASTOLIC BLOOD PRESSURE: 80 MMHG | TEMPERATURE: 97.8 F | BODY MASS INDEX: 19.22 KG/M2 | RESPIRATION RATE: 19 BRPM | WEIGHT: 101.8 LBS | SYSTOLIC BLOOD PRESSURE: 124 MMHG | OXYGEN SATURATION: 99 % | HEART RATE: 71 BPM

## 2025-07-22 DIAGNOSIS — F41.1 GENERALIZED ANXIETY DISORDER: ICD-10-CM

## 2025-07-22 DIAGNOSIS — E87.1 HYPONATREMIA: ICD-10-CM

## 2025-07-22 DIAGNOSIS — I10 ESSENTIAL HYPERTENSION: Primary | ICD-10-CM

## 2025-07-22 LAB
CALCIUM (MG/DL) IN SER/PLAS EXTERNAL: 9.6 MG/DL
CARBON DIOXIDE, TOTAL (MMOL/L) IN SER/PLAS EXTERNAL: 21 MMOL/L
CHLORIDE (MMOL/L) IN SER/PLAS EXTERNAL: 91 MMOL/L (ref 96–106)
CREATININE (MG/DL) IN SER/PLAS EXTERNAL: 0.54 MG/DL (ref 0.57–1)
GLOMERULAR FILTRATION RATE ML/MIN/1.73 SQ M.PREDICTED EXTERNAL: 111 ML/MIN/1.73M*2
GLUCOSE (MG/DL) IN SER/PLAS EXTERNAL: 73 MG/DL
POTASSIUM (MMOL/L) IN SER/PLAS EXTERNAL: 4.1 MMOL/L
SODIUM (MMOL/L) IN SER/PLAS EXTERNAL: 128 MMOL/L (ref 134–144)
UREA NITROGEN (MG/DL) IN SER/PLAS EXTERNAL: 7 MG/DL

## 2025-07-22 PROCEDURE — 3008F BODY MASS INDEX DOCD: CPT | Performed by: FAMILY MEDICINE

## 2025-07-22 PROCEDURE — 3079F DIAST BP 80-89 MM HG: CPT | Performed by: FAMILY MEDICINE

## 2025-07-22 PROCEDURE — 99212 OFFICE O/P EST SF 10 MIN: CPT | Performed by: FAMILY MEDICINE

## 2025-07-22 PROCEDURE — 3074F SYST BP LT 130 MM HG: CPT | Performed by: FAMILY MEDICINE

## 2025-07-22 RX ORDER — METOPROLOL TARTRATE 50 MG/1
50 TABLET ORAL 2 TIMES DAILY
Qty: 60 TABLET | Refills: 3 | Status: SHIPPED | OUTPATIENT
Start: 2025-07-22

## 2025-07-22 RX ORDER — LOSARTAN POTASSIUM 100 MG/1
100 TABLET ORAL DAILY
Qty: 30 TABLET | Refills: 3 | Status: SHIPPED | OUTPATIENT
Start: 2025-07-22

## 2025-07-22 RX ORDER — ESCITALOPRAM OXALATE 10 MG/1
10 TABLET ORAL DAILY
Qty: 30 TABLET | Refills: 3 | Status: SHIPPED | OUTPATIENT
Start: 2025-07-22

## 2025-07-22 ASSESSMENT — ENCOUNTER SYMPTOMS
DIZZINESS: 0
HEADACHES: 0
DECREASED CONCENTRATION: 0
SORE THROAT: 0
IRRITABILITY: 0
FREQUENCY: 0
FEVER: 0
VOMITING: 0
CONSTIPATION: 0
RHINORRHEA: 0
DYSURIA: 0
RESTLESSNESS: 0
DIARRHEA: 0
NERVOUS/ANXIOUS: 0
HEMATURIA: 0
COUGH: 0
ABDOMINAL PAIN: 0
NUMBNESS: 0
TREMORS: 0
DEPRESSED MOOD: 0
CHILLS: 0
SHORTNESS OF BREATH: 0
INSOMNIA: 0
PALPITATIONS: 0
WHEEZING: 0

## 2025-07-22 ASSESSMENT — ANXIETY QUESTIONNAIRES
4. TROUBLE RELAXING: NOT AT ALL
6. BECOMING EASILY ANNOYED OR IRRITABLE: NOT AT ALL
5. BEING SO RESTLESS THAT IT IS HARD TO SIT STILL: NOT AT ALL
IF YOU CHECKED OFF ANY PROBLEMS ON THIS QUESTIONNAIRE, HOW DIFFICULT HAVE THESE PROBLEMS MADE IT FOR YOU TO DO YOUR WORK, TAKE CARE OF THINGS AT HOME, OR GET ALONG WITH OTHER PEOPLE: NOT DIFFICULT AT ALL
GAD7 TOTAL SCORE: 0
2. NOT BEING ABLE TO STOP OR CONTROL WORRYING: NOT AT ALL
1. FEELING NERVOUS, ANXIOUS, OR ON EDGE: NOT AT ALL
7. FEELING AFRAID AS IF SOMETHING AWFUL MIGHT HAPPEN: NOT AT ALL
3. WORRYING TOO MUCH ABOUT DIFFERENT THINGS: NOT AT ALL

## 2025-07-22 ASSESSMENT — PATIENT HEALTH QUESTIONNAIRE - PHQ9
1. LITTLE INTEREST OR PLEASURE IN DOING THINGS: NOT AT ALL
SUM OF ALL RESPONSES TO PHQ9 QUESTIONS 1 AND 2: 0
2. FEELING DOWN, DEPRESSED OR HOPELESS: NOT AT ALL

## 2025-07-22 NOTE — PROGRESS NOTES
"Subjective   Patient ID: Lexii More is a 50 y.o. female who presents for Follow-up (Hypertension, Anxiety, Hyponatremia and labs).    Patient is here for follow-up on hypertension. Cardiac symptoms: none. She has no chest pain, dyspnea, exertional chest pressure/discomfort, near-syncope, orthopnea, palpitations, paroxysmal nocturnal dyspnea, and syncope. Use of agents associated with hypertension: none. History of target organ damage: none.    Anxiety  Presents for follow-up visit. Patient reports no chest pain, decreased concentration, depressed mood, dizziness, excessive worry, insomnia, irritability, nervous/anxious behavior, palpitations, restlessness, shortness of breath or suicidal ideas. Symptoms occur rarely. The quality of sleep is good.     Compliance with medications is %.        Review of Systems   Constitutional:  Negative for chills, fever and irritability.   HENT:  Negative for congestion, ear pain, nosebleeds, rhinorrhea and sore throat.    Respiratory:  Negative for cough, shortness of breath and wheezing.    Cardiovascular:  Negative for chest pain, palpitations and leg swelling.   Gastrointestinal:  Negative for abdominal pain, constipation, diarrhea and vomiting.   Genitourinary:  Negative for dysuria, frequency and hematuria.   Neurological:  Negative for dizziness, tremors, numbness and headaches.   Psychiatric/Behavioral:  Negative for decreased concentration and suicidal ideas. The patient is not nervous/anxious and does not have insomnia.        Objective   /80   Pulse 71   Temp 36.6 °C (97.8 °F)   Resp 19   Ht (!) 1.549 m (5' 1\")   Wt 46.2 kg (101 lb 12.8 oz)   SpO2 99%   BMI 19.23 kg/m²     Physical Exam  Constitutional:       General: She is not in acute distress.     Appearance: Normal appearance.   HENT:      Head: Normocephalic and atraumatic.      Mouth/Throat:      Mouth: Mucous membranes are moist.      Pharynx: Oropharynx is clear. No oropharyngeal exudate " or posterior oropharyngeal erythema.     Eyes:      General: No scleral icterus.     Extraocular Movements: Extraocular movements intact.      Pupils: Pupils are equal, round, and reactive to light.       Cardiovascular:      Rate and Rhythm: Normal rate and regular rhythm.      Pulses: Normal pulses.      Heart sounds: No murmur heard.     No friction rub. No gallop.   Pulmonary:      Effort: Pulmonary effort is normal.      Breath sounds: No wheezing, rhonchi or rales.     Skin:     General: Skin is warm.      Coloration: Skin is not jaundiced or pale.      Findings: No erythema or rash.     Neurological:      General: No focal deficit present.      Mental Status: She is alert and oriented to person, place, and time.      Cranial Nerves: No cranial nerve deficit.      Sensory: No sensory deficit.      Coordination: Coordination normal.      Gait: Gait normal.       Abstract on 01/27/2025   Component Date Value Ref Range Status    Glucose 01/24/2025 77  mg/dL Final    Urea Nitrogen 01/24/2025 14  mg/dL Final    Creatinine 01/24/2025 0.65  mg/dL Final    Potassium 01/24/2025 4.0  mmol/L Final    Sodium 01/24/2025 132  mmol/L Final      Her most recent lab result from [facility] revealed sodium 128 down from previous level 132.  She admits to excessive drinking of free water.  She also consumes alcohol in significant amount especially over the weekends.    Assessment/Plan   Problem List Items Addressed This Visit       Generalized anxiety disorder    Well-controlled, continue current medications and management. The risks and benefits of my recommendations, as well as other treatment options were discussed with the patient today.    The side effects of the medications were discussed.  Advised not to take the medication with alcohol .  Exercise regularly and this can give you a sense of well being and help decrease feelings of anxiety.;  Get plenty of sleep. Sleep rests your brain as well as your body, and can improve  "your general sense of wellbeing as well as your mood.;  Avoid alcohol and drug abuse. It may seem that alcohol or drugs relax you. But in the long run they make anxiety worse and cause more problems.;  Avoid caffeine. Caffeine is found in coffee, tea, soft drinks and chocolate. Caffeine may increase your sense of anxiety because it stimulates your nervous system. Also avoid over-the-counter diet pills, and cough and cold medicines that contain a decongestant..         Relevant Medications    escitalopram (Lexapro) 10 mg tablet    Other Relevant Orders    Follow Up In [facility] - PCP - Established    Hyponatremia    Worsening. We will order labs for further evaluation. She admits to large amount of alcohol consumption which may be the primary cause of her hyponatremia. She was advised on the importance of limiting her alcohol intake and cessation. We will also have her decrease her free fluid and start drinking balanced electrolytes such as pedialyte or gatorade.   The implications of hyponatremia was discussed with her extensively.         Relevant Orders    Osmolality    Osmolality, urine    Sodium, urine, random    Basic metabolic panel    Essential hypertension - Primary    Well-controlled, continue current medications and management.  Dietary sodium restriction.  Regular aerobic exercise program is recommended to help achieve and maintain normal body weight, fitness and improve lipid balance. .  Dietary changes: Increase soluble fiber  Plant sterols 2grams per day (e.g. Benecol)  Reduce saturated fat, \"trans\" monounsaturated fatty acids, and cholesterol         Relevant Medications    metoprolol tartrate (Lopressor) 50 mg tablet    losartan (Cozaar) 100 mg tablet    Other Relevant Orders    Follow Up In [facility] - PCP - Established     Scribe Attestation  By signing my name below, I, Dior Trujillo   attest that this documentation has been prepared under the direction and in the " presence of Good Franco MD.

## 2025-07-22 NOTE — ASSESSMENT & PLAN NOTE
Worsening. We will order labs for further evaluation. She admits to large amount of alcohol consumption which may be the primary cause of her hyponatremia. She was advised on the importance of limiting her alcohol intake and cessation. We will also have her decrease her free fluid and start drinking balanced electrolytes such as pedialyte or gatorade.   The implications of hyponatremia was discussed with her extensively.

## 2025-07-22 NOTE — ASSESSMENT & PLAN NOTE
Well-controlled, continue current medications and management. The risks and benefits of my recommendations, as well as other treatment options were discussed with the patient today.    The side effects of the medications were discussed.  Advised not to take the medication with alcohol .  Exercise regularly and this can give you a sense of well being and help decrease feelings of anxiety.;  Get plenty of sleep. Sleep rests your brain as well as your body, and can improve your general sense of wellbeing as well as your mood.;  Avoid alcohol and drug abuse. It may seem that alcohol or drugs relax you. But in the long run they make anxiety worse and cause more problems.;  Avoid caffeine. Caffeine is found in coffee, tea, soft drinks and chocolate. Caffeine may increase your sense of anxiety because it stimulates your nervous system. Also avoid over-the-counter diet pills, and cough and cold medicines that contain a decongestant..